# Patient Record
Sex: MALE | Race: WHITE
[De-identification: names, ages, dates, MRNs, and addresses within clinical notes are randomized per-mention and may not be internally consistent; named-entity substitution may affect disease eponyms.]

---

## 2018-05-20 ENCOUNTER — HOSPITAL ENCOUNTER (EMERGENCY)
Dept: HOSPITAL 41 - JD.ED | Age: 48
Discharge: HOME | End: 2018-05-20
Payer: COMMERCIAL

## 2018-05-20 DIAGNOSIS — K02.9: Primary | ICD-10-CM

## 2018-05-20 DIAGNOSIS — K04.7: ICD-10-CM

## 2018-05-20 DIAGNOSIS — F17.210: ICD-10-CM

## 2018-05-20 PROCEDURE — 99282 EMERGENCY DEPT VISIT SF MDM: CPT

## 2018-05-20 PROCEDURE — 96372 THER/PROPH/DIAG INJ SC/IM: CPT

## 2018-05-20 NOTE — EDM.PDOC
ED HPI GENERAL MEDICAL PROBLEM





- General


Chief Complaint: ENT Problem


Stated Complaint: ABCESS TOOTH


Time Seen by Provider: 05/20/18 03:35


Source of Information: Reports: Patient


History Limitations: Reports: No Limitations





- History of Present Illness


INITIAL COMMENTS - FREE TEXT/NARRATIVE: 





This is a 47-year-old male. Onset with tooth pain and swelling around 7 PM 

yesterday evening. He now also has a swollen area in the lower right jaw where 

his tooth is bothering him. He's had a history of tooth problems and this one 

particular. He has no dentist that he sees. I'll provide a list of local 

dentists that take care of this tooth problem. He denies any fever or chills 

denies any nausea or vomiting.


  ** Right Lower Tooth/Teeth


Pain Score (Numeric/FACES): 6





- Related Data


 Allergies











Allergy/AdvReac Type Severity Reaction Status Date / Time


 


No Known Allergies Allergy   Verified 05/20/18 03:07











Home Meds: 


 Home Meds





Hydrocodone/Acetaminophen [Hydrocodon-Acetaminophen 5-325] 1 each PO Q6H PRN #8 

tablet 05/20/18 [Rx]


Penicillin V Potassium [Veetids] 500 mg PO Q6H #28 tab 05/20/18 [Rx]











Past Medical History





- Past Health History


Medical/Surgical History: Denies Medical/Surgical History





Social & Family History





- Family History


Family Medical History: Noncontributory





- Tobacco Use


Smoking Status *Q: Current Every Day Smoker


Years of Tobacco use: 30


Packs/Tins Daily: 0.5





- Caffeine Use


Caffeine Use: Reports: Soda





- Recreational Drug Use


Recreational Drug Use: No





ED ROS ENT





- Review of Systems


Review Of Systems: See Below


Constitutional: Denies: Fever, Chills


HEENT: Reports: Other (As per history of present illness)


Respiratory: Reports: No Symptoms


Cardiovascular: Reports: No Symptoms


Endocrine: Reports: No Symptoms


GI/Abdominal: Reports: No Symptoms


: Reports: No Symptoms


Musculoskeletal: Reports: No Symptoms


Skin: Reports: Other (As per history of present illness)


Neurological: Reports: No Symptoms


Psychiatric: Reports: No Symptoms


Hematologic/Lymphatic: Reports: No Symptoms





ED EXAM, ENT





- Physical Exam


Exam: See Below


Exam Limited By: No Limitations


General Appearance: Alert, WD/WN, No Apparent Distress


Eye Exam: Bilateral Eye: Normal Inspection


Ears: Normal External Exam


Nose: Normal Inspection


Mouth/Throat: Normal Lips, Normal Oropharynx, Other (The right lower jaw 

towards the front he has 2 teeth that have advanced dental caries and there is 

eroded down to the gum, the gum itself has some slight swelling and there is 

soft tissue swelling over that area in the right lower jaw region, he has 

multiple other teeth with advanced dental caries)


Head: Normocephalic


Neck: Supple.  No: Lymphadenopathy (L), Lymphadenopathy (R)


Respiratory/Chest: No Respiratory Distress


Back: Full Range of Motion


Extremities: Normal Inspection, Normal Range of Motion


Neurological: Alert, Oriented


Psychiatric: Normal Affect, Normal Mood


Skin: Warm, Dry





Course





- Vital Signs


Last Recorded V/S: 





 Last Vital Signs











Temp  97.1 F   05/20/18 03:05


 


Pulse  92   05/20/18 03:05


 


Resp  16   05/20/18 03:05


 


BP  164/108 H  05/20/18 03:05


 


Pulse Ox  94 L  05/20/18 03:05














- Orders/Labs/Meds


Orders: 





 Active Orders 24 hr











 Category Date Time Status


 


 cefTRIAXone 1 GM with Lidocaine 1% 2.1 ML IM Med  05/20/18 03:45 Ordered





 cefTRIAXone [Rocephin] 1 gm   





 Lidocaine 1% [Xylocaine 1%] 2.1 ml   





 IM Q24H   








 Medication Orders





Ceftriaxone Sodium 1 gm/ (Lidocaine HCl 2.1 ml)  0 gm IM Q24H [facility]








Meds: 





Medications











Generic Name Dose Route Start Last Admin





  Trade Name Freq  PRN Reason Stop Dose Admin


 


Ceftriaxone Sodium 1 gm/  0 gm  05/20/18 03:45  





  Lidocaine HCl 2.1 ml  IM   





  Q24H [facility]   





     





     





     





     














Departure





- Departure


Time of Disposition: 03:42


Disposition: Home, Self-Care 01


Condition: Good


Clinical Impression: 


 Dental caries, Dental infection, Pain, dental, Soft tissue infection








- Discharge Information


Prescriptions: 


Hydrocodone/Acetaminophen [Hydrocodon-Acetaminophen 5-325] 1 each PO Q6H PRN #8 

tablet


 PRN Reason: Pain


Penicillin V Potassium [Veetids] 500 mg PO Q6H #28 tab


Referrals: 


PCP,None [Primary Care Provider] - 


Additional Instructions: 


Get the antibiotics and the pain medicines tomorrow and start taking them, take 

the antibiotics faithfully until they're finished and use the pain medicine as 

needed, I gave you a list of dentists in town find one that can help extract 

your tooth, return to the ER as needed





- My Orders


Last 24 Hours: 





My Active Orders





05/20/18 03:45


cefTRIAXone 1 GM with Lidocaine 1% 2.1 ML IM cefTRIAXone [Rocephin] 1 gm 

Lidocaine 1% [Xylocaine 1%] 2.1 ml IM Q24H 














- Assessment/Plan


Last 24 Hours: 





My Active Orders





05/20/18 03:45


cefTRIAXone 1 GM with Lidocaine 1% 2.1 ML IM cefTRIAXone [Rocephin] 1 gm 

Lidocaine 1% [Xylocaine 1%] 2.1 ml IM Q24H